# Patient Record
Sex: MALE | Race: WHITE
[De-identification: names, ages, dates, MRNs, and addresses within clinical notes are randomized per-mention and may not be internally consistent; named-entity substitution may affect disease eponyms.]

---

## 2020-09-18 ENCOUNTER — HOSPITAL ENCOUNTER (OUTPATIENT)
Dept: HOSPITAL 11 - JP.SDS | Age: 70
Discharge: HOME | End: 2020-09-18
Attending: SURGERY
Payer: MEDICARE

## 2020-09-18 DIAGNOSIS — E11.9: ICD-10-CM

## 2020-09-18 DIAGNOSIS — Z12.11: Primary | ICD-10-CM

## 2020-09-18 DIAGNOSIS — K57.30: ICD-10-CM

## 2020-09-18 DIAGNOSIS — G47.33: ICD-10-CM

## 2020-09-18 DIAGNOSIS — K64.9: ICD-10-CM

## 2020-09-18 DIAGNOSIS — E66.9: ICD-10-CM

## 2020-09-18 DIAGNOSIS — E78.5: ICD-10-CM

## 2020-09-18 NOTE — OR
DATE OF PROCEDURE:  09/18/2020

 

SURGEON:  Kaden Arriaza MD

 

PREOPERATIVE DIAGNOSIS:  Indications for screening colonoscopy.

 

POSTOPERATIVE DIAGNOSES:  Normal colonoscopy other than for minimal left colonic

diverticulosis.

 

OPERATIVE PROCEDURE:  Flexible colonoscopy.

 

ANESTHESIA:  IV sedation.

 

INDICATION FOR PROCEDURE:  This is a 70-year-old male presenting for screening colonoscopy.

He has no personal or family history of colon neoplasia.  Plan is to proceed with

colonoscopy with biopsies and/or polypectomy as indicated.  Potential risks of the procedure

including bleeding and perforation were discussed, and the patient wishes to proceed.

 

DETAILS OF PROCEDURE:  The patient was taken to the operating room and placed in a left

lateral decubitus position.  IV sedation was administered, after which the initial digital

rectal exam was performed, which was unremarkable.  Colonoscope was passed into the rectum

with retroflexion revealing uncomplicated hemorrhoidal columns.  Scope was eventually passed

to the level of the cecum.  The prep was good with only a small amount of liquid stool

present.  To that level, there was some uncomplicated relatively minimal diverticulosis in

the left colon.  Apart from that, there were no areas of colitis and no polyps or other

signs of neoplasia.  The scope was then withdrawn the above findings were reconfirmed, and

the procedure then concluded.

 

Recommendation would be to repeat the colonoscopy in 10 years.

 

 

 

 

Kaden Arriaza MD

DD:  09/18/2020 08:06:53

DT:  09/18/2020 17:47:52

Job #:  [id number]

## 2022-08-16 ENCOUNTER — HOSPITAL ENCOUNTER (EMERGENCY)
Dept: HOSPITAL 11 - JP.ED | Age: 72
Discharge: HOME | End: 2022-08-16
Payer: MEDICARE

## 2022-08-16 DIAGNOSIS — Z79.82: ICD-10-CM

## 2022-08-16 DIAGNOSIS — Z79.84: ICD-10-CM

## 2022-08-16 DIAGNOSIS — L03.221: Primary | ICD-10-CM

## 2022-08-16 DIAGNOSIS — E11.9: ICD-10-CM

## 2022-08-16 DIAGNOSIS — E78.00: ICD-10-CM

## 2022-08-16 DIAGNOSIS — Z79.899: ICD-10-CM

## 2022-08-16 DIAGNOSIS — M25.511: ICD-10-CM

## 2022-08-16 DIAGNOSIS — T36.8X5A: ICD-10-CM

## 2022-08-16 PROCEDURE — 99283 EMERGENCY DEPT VISIT LOW MDM: CPT
